# Patient Record
Sex: MALE | Race: ASIAN | URBAN - METROPOLITAN AREA
[De-identification: names, ages, dates, MRNs, and addresses within clinical notes are randomized per-mention and may not be internally consistent; named-entity substitution may affect disease eponyms.]

---

## 2022-06-20 ENCOUNTER — EMERGENCY (EMERGENCY)
Facility: HOSPITAL | Age: 22
LOS: 1 days | Discharge: HOME | End: 2022-06-20
Attending: EMERGENCY MEDICINE
Payer: COMMERCIAL

## 2022-06-20 VITALS — HEART RATE: 87 BPM

## 2022-06-20 VITALS
WEIGHT: 160.06 LBS | TEMPERATURE: 98 F | SYSTOLIC BLOOD PRESSURE: 132 MMHG | DIASTOLIC BLOOD PRESSURE: 81 MMHG | HEIGHT: 65 IN | RESPIRATION RATE: 16 BRPM | HEART RATE: 107 BPM | OXYGEN SATURATION: 99 %

## 2022-06-20 PROCEDURE — 99283 EMERGENCY DEPT VISIT LOW MDM: CPT

## 2022-06-20 PROCEDURE — 73610 X-RAY EXAM OF ANKLE: CPT | Mod: 26,LT

## 2022-06-20 PROCEDURE — 99284 EMERGENCY DEPT VISIT MOD MDM: CPT | Mod: 25

## 2022-06-20 PROCEDURE — 73630 X-RAY EXAM OF FOOT: CPT | Mod: 26,LT

## 2022-06-20 PROCEDURE — 73630 X-RAY EXAM OF FOOT: CPT

## 2022-06-20 PROCEDURE — 73610 X-RAY EXAM OF ANKLE: CPT

## 2022-06-20 RX ORDER — ACETAMINOPHEN 500 MG
975 TABLET ORAL ONCE
Refills: 0 | Status: COMPLETED | OUTPATIENT
Start: 2022-06-20 | End: 2022-06-20

## 2022-06-20 RX ORDER — IBUPROFEN 200 MG
600 TABLET ORAL ONCE
Refills: 0 | Status: COMPLETED | OUTPATIENT
Start: 2022-06-20 | End: 2022-06-20

## 2022-06-20 RX ADMIN — Medication 600 MILLIGRAM(S): at 22:07

## 2022-06-20 RX ADMIN — Medication 600 MILLIGRAM(S): at 22:37

## 2022-06-20 RX ADMIN — Medication 975 MILLIGRAM(S): at 22:07

## 2022-06-20 RX ADMIN — Medication 975 MILLIGRAM(S): at 22:37

## 2022-06-20 NOTE — ED PROVIDER NOTE - NSFOLLOWUPCLINICS_GEN_ALL_ED_FT
[facility]  Orthopedics  [address] [place] [address]  Phone: [phone]  Fax: [phone]  Follow Up Time: 4-6 Days

## 2022-06-20 NOTE — ED PROVIDER NOTE - PROGRESS NOTE DETAILS
Paulo PGY3 - XR negative for fractures.  Likely inversion sprain.  Aircast placed.  Will dc w/ ortho f/u and return precautions.  All other questions and concerns have been addressed.  HR elevated, but pt. was in significant pain during then.  Pt. not in pain now and clinically, does not appear to have VS derangements.

## 2022-06-20 NOTE — ED PROVIDER NOTE - CLINICAL SUMMARY MEDICAL DECISION MAKING FREE TEXT BOX
22M p/w left foot and ankle pain/swelling after twisting it on Saturday night.  Exam as above, VSS.  Pt. unable to bear complete weight 2/2 pain.  Will obtain XR to eval for fracture.  Possible sprain vs strain.  Pt. appears amped up but states it's because he doesn't sleep much and drinks "a lot of coffee per day."  States his record is 24 cups a day.  Will administer analgesics, reassess, and dispo pending results.

## 2022-06-20 NOTE — ED PROVIDER NOTE - ATTENDING CONTRIBUTION TO CARE
I was physically present for the E/M service provided. I agree with above history, physical, and plan which I have reviewed and edited where appropriate. I was physically present for the key portions of the service provided.    Vidal: pt running around and twisting foot ankle. continue walking even though painful. + swelling. xr, foot and ankle. motrin. ice, elevate, ace wrap, podiatry follow up. dc

## 2022-06-20 NOTE — ED PROVIDER NOTE - PHYSICAL EXAMINATION
GENERAL: Patient awake, appears very hypervigilant.  HEENT: NC/AT.  EXT: Left foot swelling. Pain w/ inversion of ankle. DP 2+ b/l. No bony TTP over foot.  NEURO: A&Ox3. Pt. ambulates w/ difficulty 2/2 pain.  SKIN: Warm and dry. No rash.  PSYCH: Normal affect.

## 2022-06-20 NOTE — ED PROVIDER NOTE - PATIENT PORTAL LINK FT
You can access the FollowMyHealth Patient Portal offered by [facility] by registering at the following website: [email or web address]. By joining [email or web address] FollowMyHealth portal, you will also be able to view your health information using other applications (apps) compatible with our system.

## 2022-06-20 NOTE — ED PROVIDER NOTE - OBJECTIVE STATEMENT
22M w/ no significant PMHx p/w left foot injury and pain for the last two days.  Pt. states he was playfully chasing after a girl when he turned around to look at his friends.  He twisted his ankle during this process, causing the pain and swelling.  He tried to "tough it out" on Saturday, but sxs were worsening so took three Tylenol and an Advil, after which he was able to sleep.  Pt. states when this happened, he hadn't slept the night before and had worked for 10-12 hours.  States he doesn't sleep much usually as he has a lot of work and family who depend on him.  Pt. had snorted cocaine a few hours before the incident and states he uses cocaine ~twice a month.  Smokes ~10 cigarettes per day and smokes marijuana twice a day.  Denies any IVDA.  Pt. states he had ~6 hours of sleep last night when the pain subsided.  Denies daily etoh use but did drink some last night to help him sleep.

## 2022-06-20 NOTE — ED PROVIDER NOTE - NSFOLLOWUPINSTRUCTIONS_ED_ALL_ED_FT
You were seen for left ankle pain and swelling after twisting it.  This is due to a sprain.    The xrays did not show any fracture.    Take Tylenol and Aleve as needed for the pain -- refer to the pill bottles for dosage and safety instructions.    Follow-up with an orthopaedic surgeon within one week.    Prioritize sleep and decrease your caffeine intake.    If you have any severe increase in pain, fever, uncontrollable nausea vomiting, inability to tolerate eating and drinking, or any other concerning symptoms, return immediately to the Emergency Department.

## 2022-06-20 NOTE — ED PROVIDER NOTE - NS ED ATTENDING STATEMENT MOD
I have seen and examined this patient and fully participated in the care of this patient as the teaching attending.  The service was shared with the [facility].  I reviewed and verified the documentation and independently performed the documented: Attending with